# Patient Record
Sex: MALE | Race: WHITE | URBAN - METROPOLITAN AREA
[De-identification: names, ages, dates, MRNs, and addresses within clinical notes are randomized per-mention and may not be internally consistent; named-entity substitution may affect disease eponyms.]

---

## 2017-01-05 ENCOUNTER — HOSPITAL ENCOUNTER (EMERGENCY)
Facility: CLINIC | Age: 47
Discharge: HOME | End: 2017-01-05
Attending: EMERGENCY MEDICINE | Admitting: EMERGENCY MEDICINE
Payer: MEDICARE

## 2017-01-05 VITALS
RESPIRATION RATE: 16 BRPM | HEART RATE: 90 BPM | DIASTOLIC BLOOD PRESSURE: 91 MMHG | TEMPERATURE: 97.6 F | OXYGEN SATURATION: 96 % | SYSTOLIC BLOOD PRESSURE: 113 MMHG

## 2017-01-05 DIAGNOSIS — R51.9 ACUTE INTRACTABLE HEADACHE, UNSPECIFIED HEADACHE TYPE: ICD-10-CM

## 2017-01-05 LAB — INR PPP: 2.89 (ref 0.86–1.14)

## 2017-01-05 PROCEDURE — 99285 EMERGENCY DEPT VISIT HI MDM: CPT | Mod: 25

## 2017-01-05 PROCEDURE — 96375 TX/PRO/DX INJ NEW DRUG ADDON: CPT

## 2017-01-05 PROCEDURE — 96374 THER/PROPH/DIAG INJ IV PUSH: CPT

## 2017-01-05 PROCEDURE — 25000125 ZZHC RX 250: Performed by: EMERGENCY MEDICINE

## 2017-01-05 PROCEDURE — 85610 PROTHROMBIN TIME: CPT | Performed by: EMERGENCY MEDICINE

## 2017-01-05 RX ORDER — HALOPERIDOL 5 MG/ML
2 INJECTION INTRAMUSCULAR ONCE
Status: COMPLETED | OUTPATIENT
Start: 2017-01-05 | End: 2017-01-05

## 2017-01-05 RX ORDER — DEXAMETHASONE SODIUM PHOSPHATE 10 MG/ML
10 INJECTION, SOLUTION INTRAMUSCULAR; INTRAVENOUS ONCE
Status: COMPLETED | OUTPATIENT
Start: 2017-01-05 | End: 2017-01-05

## 2017-01-05 RX ORDER — DIPHENHYDRAMINE HYDROCHLORIDE 50 MG/ML
25 INJECTION INTRAMUSCULAR; INTRAVENOUS ONCE
Status: COMPLETED | OUTPATIENT
Start: 2017-01-05 | End: 2017-01-05

## 2017-01-05 RX ADMIN — DIPHENHYDRAMINE HYDROCHLORIDE 25 MG: 50 INJECTION, SOLUTION INTRAMUSCULAR; INTRAVENOUS at 18:20

## 2017-01-05 RX ADMIN — DEXAMETHASONE SODIUM PHOSPHATE 10 MG: 10 INJECTION, SOLUTION INTRAMUSCULAR; INTRAVENOUS at 18:21

## 2017-01-05 RX ADMIN — HALOPERIDOL LACTATE 2 MG: 5 INJECTION, SOLUTION INTRAMUSCULAR at 18:20

## 2017-01-05 ASSESSMENT — ENCOUNTER SYMPTOMS
HEADACHES: 1
PHOTOPHOBIA: 1
FEVER: 0
CHILLS: 0

## 2017-01-05 NOTE — ED AVS SNAPSHOT
[facility] Emergency Department    [address]    [facility] [address]    Phone:  [phone]    Fax:  [phone]                                       Ray Araiza   MRN: [id number]    Department:  [facility] Emergency Department   Date of Visit:  1/5/2017           Patient Information     Date Of Birth          1970        Your diagnoses for this visit were:     Acute intractable headache, unspecified headache type        You were seen by Bettie Smith MD.      Follow-up Information     Follow up with [facility] Emergency Department.    Specialty:  EMERGENCY MEDICINE    Why:  immediately , If symptoms worsen    Contact information:    [address]  [facility] [address] [phone]        Follow up with No Ref-Primary, Physician In 2 days.    Why:  regarding chronic headaches        Discharge Instructions       Discharge Instructions  Headache    You were seen today for a headache. Headaches may be caused by many different things such as muscle tension, sinus inflammation, anxiety and stress, having too little sleep, too much alcohol, some medical conditions or injury. You may have a migraine, which is caused by changes in the blood vessels in your head.  At this time your doctor does not find that your headache is a sign of anything dangerous or life-threatening.  However, sometimes the signs of serious illness do not show up right away.  If you have new or worse symptoms, you may need to be seen again in the emergency department or by your primary doctor.      Return to the Emergency Department if:    You get a fever of 101 F or higher.    Your headache gets much worse.    You get a stiff neck with your headache.    You get a new headache that is different or worse than headaches you have had before.    You are vomiting and can t keep food or water down.    You have blurry or double vision or other problems with your  eyes.    You have a new weakness on one side of your body.    You have difficulty with balance which is new.    You or your family thinks you are confused.    You have a seizure or convulsion.    What can I do to help myself?    Pain medications - You may take a pain medication such as Tylenol  (acetaminophen), Advil , Nuprin  (ibuprofen) or Aleve  (naproxen).  If you have been given a narcotic such as Vicodin  (hydrocodone with acetaminophen), Percocet  (oxycodone with acetaminophen), codeine, or a muscle relaxant such as Flexeril  (cyclobenzaprine) or Soma  (carisoprodol), do not drive for four hours after you have taken it. If the narcotic contains Tylenol  (acetaminophen), do not take Tylenol  with it. All narcotics will cause constipation, so eat a high fiber diet.        Take a pain reliever as soon as you notice symptoms.  Starting medications as soon as you start to have symptoms may lessen the amount of pain you have.    Relaxing in a quiet, dark room may help.    Get enough sleep and eat meals regularly.    Schedule an appointment with your primary physician as instructed, or at least within 1 week.    You may need to watch for certain foods or other things which may trigger your headaches.  Keeping a journal of your headaches and possible triggers may help you and your primary doctor to identify things which you should avoid which may be causing your headaches.  If you were given a prescription for medicine here today, be sure to read all of the information (including the package insert) that comes with your prescription.  This will include important information about the medicine, its side effects, and any warnings that you need to know about.  The pharmacist who fills the prescription can provide more information and answer questions you may have about the medicine.  If you have questions or concerns that the pharmacist cannot address, please call or return to the Emergency Department.       24 Hour  Appointment Hotline       To make an appointment at any [facility], call [phone] ([phone]). If you don't have a family doctor or clinic, we will help you find one. [place] clinics are conveniently located to serve the needs of you and your family.             Review of your medicines      Our records show that you are taking the medicines listed below. If these are incorrect, please call your family doctor or clinic.        Dose / Directions Last dose taken    ATIVAN PO        Refills:  0        COUMADIN PO        Refills:  0        ibuprofen 100 MG/5ML suspension   Commonly known as:  ADVIL/MOTRIN   Dose:  10 mg/kg        Take 10 mg/kg by mouth every 6 hours as needed for fever or moderate pain   Refills:  0        KLONOPIN PO        Refills:  0        SEROQUEL PO        Refills:  0        TYLENOL PO        Refills:  0                Procedures and tests performed during your visit     INR    Peripheral IV catheter      Orders Needing Specimen Collection     None      Pending Results     No orders found from 1/4/2017 to 1/6/2017.            Pending Culture Results     No orders found from 1/4/2017 to 1/6/2017.       Test Results from your hospital stay           1/5/2017  6:53 PM - Interface, [place] Results      Component Results     Component Value Ref Range & Units Status    INR 2.89 (H) 0.86 - 1.14 Final                Clinical Quality Measure: Blood Pressure Screening     Your blood pressure was checked while you were in the emergency department today. The last reading we obtained was  BP: (!) 113/91 mmHg . Please read the guidelines below about what these numbers mean and what you should do about them.  If your systolic blood pressure (the top number) is less than 120 and your diastolic blood pressure (the bottom number) is less than 80, then your blood pressure is normal. There is nothing more that you need to do about it.  If your systolic blood pressure (the top number) is 120-139 or  "your diastolic blood pressure (the bottom number) is 80-89, your blood pressure may be higher than it should be. You should have your blood pressure rechecked within a year by a primary care provider.  If your systolic blood pressure (the top number) is 140 or greater or your diastolic blood pressure (the bottom number) is 90 or greater, you may have high blood pressure. High blood pressure is treatable, but if left untreated over time it can put you at risk for heart attack, stroke, or kidney failure. You should have your blood pressure rechecked by a primary care provider within the next 4 weeks.  If your provider in the emergency department today gave you specific instructions to follow-up with your doctor or provider even sooner than that, you should follow that instruction and not wait for up to 4 weeks for your follow-up visit.        Thank you for choosing [facility]       Thank you for choosing [facility] for your care. Our goal is always to provide you with excellent care. Hearing back from our patients is one way we can continue to improve our services. Please take a few minutes to complete the written survey that you may receive in the mail after you visit with us. Thank you!        [facility] Information     [facility] lets you send messages to your doctor, view your test results, renew your prescriptions, schedule appointments and more. To sign up, go to [email or web address] . Click on \"Log in\" on the left side of the screen, which will take you to the Welcome page. Then click on \"Sign up Now\" on the right side of the page.     You will be asked to enter the access code listed below, as well as some personal information. Please follow the directions to create your username and password.     Your access code is: [email or web address]  Expires: 2017  6:40 PM     Your access code will  in 90 days. If you need help or a new code, please call your [facility] clinic or [phone].        Care EveryWhere ID     This " is your Care EveryWhere ID. This could be used by other organizations to access your [facility] medical records  [id number]        After Visit Summary       This is your record. Keep this with you and show to your community pharmacist(s) and doctor(s) at your next visit.

## 2017-01-05 NOTE — ED NOTES
A&Ox3, ABC's intact  Pt states he's had a migraine headache for 8 days. Denies head injury. Pt states that Nubaine and Vistril are the only meds that work for him.

## 2017-01-05 NOTE — ED AVS SNAPSHOT
[facility] Emergency Department    [address]    [facility] [address]    Phone:  [phone]    Fax:  [phone]                                       Ray Araiza   MRN: [id number]    Department:  [facility] Emergency Department   Date of Visit:  1/5/2017           After Visit Summary Signature Page     I have received my discharge instructions, and my questions have been answered. I have discussed any challenges I see with this plan with the nurse or doctor.    ..........................................................................................................................................  Patient/Patient Representative Signature      ..........................................................................................................................................  Patient Representative Print Name and Relationship to Patient    ..................................................               ................................................  Date                                            Time    ..........................................................................................................................................  Reviewed by Signature/Title    ...................................................              ..............................................  Date                                                            Time

## 2017-01-05 NOTE — ED PROVIDER NOTES
History     Chief Complaint:  Headache      The history is provided by the patient.      Ray Araiza is a 46 year old male with history of chronic recurrent migraines and CVA in 2006 with subsequent left sided deficit (largely resolved) s/p AVR on Coumadin who presents with headache.  The patient reports he has had a migraine for the past 8 days prompting visit to the emergency department.  This migraine is described as typical for him, diffuse in nature, and associated with photophobia.  He reports that due to his AVR he cannot take most typical headache medications and is typically given Nubaine and Vistril which help him go home and sleep them off.  He states this regimen is recommended by his doctors.  He endorses experiencing migraines once monthly.  Patient reports his Coumadin dose was increased to 7.5 mg a week prior although his INR was within range at that time.  He notes he has not seen a neurologist for a while, since 5/16.  They had been doing Botox which was helping but had to stop because he was building up a resistance.  He denies fevers, chills, rash, recent head trauma or falls, or other complaint.  He can     Allergies:  Augmentin  Cephalosporins  Compazine  Imitrex  Toradol - Rash       Medications:    Klonopin  Coumadin  Seroquel  Ativan     Past Medical History:    Migraine   CVA     Past Surgical History:    Aortic valve replacement   Vascular surgery    Family History:    History reviewed. No pertinent family history.       Social History:  Presents with girlfriend   Tobacco use: Never smoker, current chewing tobacco user  Alcohol use: Negative   Marital Status:  Single        Review of Systems   Constitutional: Negative for fever and chills.   Eyes: Positive for photophobia.   Skin: Negative for rash.   Neurological: Positive for headaches.   All other systems reviewed and are negative.      Physical Exam     Patient Vitals for the past 24 hrs:   BP Temp Temp src Pulse Resp SpO2    01/05/17 1830 113/85 mmHg - - - - 96 %   01/05/17 1730 - - - - - 98 %   01/05/17 1729 (!) 141/94 mmHg - - - - -   01/05/17 1542 144/89 mmHg 97.6  F (36.4  C) Oral 90 16 100 %        Physical Exam  Gen: Pleasant, appears stated age.  Wearing sunglasses.    Eye:   Pupils are equal, round, and reactive.     Sclera non-injected.    ENT:   Moist mucus membranes.     Normal tongue.    Oropharynx without lesions.    Cardiac:     Normal rate and regular rhythm.    Systolic ejection murmur and mechanical click.  No gallops, or rubs.    Pulmonary:     Clear to auscultation bilaterally.    No wheezes, rales, or rhonchi.    Abdomen:     Normal active bowel sounds.     Abdomen is soft and non-distended, without focal tenderness.    Musculoskeletal:     Normal movement of all extremities without evidence for deficit.    Extremities:    No edema.    Skin:   Warm and dry.    Neurologic:    Speech is fluent, cognition is normal.     EOMI, symmetric grimace.     Str 5/5 in RUE, LUE, RLE, LLE.     Fine touch sensation intact in BUE/BLE.     Psychiatric:     Normal affect with appropriate interaction with examiner.       Emergency Department Course   Laboratory:  INR: 2.89 (H)    Interventions:  1820: Haldol 2 mg IV  1820: Benadryl 25 mg IV  1821: Decadron 10 mg IV     Emergency Department Course:  Past medical records, nursing notes, and vitals reviewed.  1738: I performed an exam of the patient and obtained history, as documented above.   Discussed narcotic/opioid policy regarding use with headaches, patient agrees to try headache protocol.  IV inserted and blood drawn.   1836: I rechecked the patient. Explained findings to patient.  He endorses no change in pain but declines further intervention.   I personally reviewed the laboratory results with the Patient and answered all related questions prior to discharge.    1839:  Findings and plan explained to the Patient. Patient discharged home with instructions regarding supportive  care, medications, and reasons to return. The importance of close follow-up was reviewed.       Impression & Plan    Medical Decision Making:  Ray Araiza is a 46 year old male with history of aortic valve repair, CVA, and chronic migraines that have been difficult to manage with medical therapy who presents today with symptoms consistent with chronic migraines.  At this point, I do not have a suspicion for dangerous process such as subarachnoid hemorrhage, meningitis, temporal arteritis, or intracranial hemorrhage.  He has a normal neurological exam and normal mental status.  He has normal vital signs.  He received the medications listed above and reports he has no improvement.  We discussed, as with previous visits, that opioid medications are not indicated for migraine headaches.  He understands this and does not want to stay in the ED for any other medications if he is not going to be receiving opioids.  I have recommended follow up with his PCP or his headache specialist to discuss treatment plan going forward.  Otherwise, he will return to the ED for worsening of his condition including increasing headache, or for any other concerns.     Diagnosis:    ICD-10-CM    1. Acute intractable headache, unspecified headache type R51        Disposition:  Discharged to home with plan as outlined.      Lyle Seaman  1/5/2017   [facility] EMERGENCY DEPARTMENT    I, Lyle Seaman, am serving as a scribe at 5:38 PM on 1/5/2017 to document services personally performed by Bettie Smith MD based on my observations and the provider's statements to me.      Bettie Smith MD  01/06/17 1000

## 2017-01-06 NOTE — DISCHARGE INSTRUCTIONS

## 2021-06-02 ENCOUNTER — RECORDS - HEALTHEAST (OUTPATIENT)
Dept: ADMINISTRATIVE | Facility: CLINIC | Age: 51
End: 2021-06-02